# Patient Record
Sex: MALE | Race: WHITE | Employment: OTHER | URBAN - METROPOLITAN AREA
[De-identification: names, ages, dates, MRNs, and addresses within clinical notes are randomized per-mention and may not be internally consistent; named-entity substitution may affect disease eponyms.]

---

## 2017-05-18 PROBLEM — F98.8 ATTENTION DEFICIT DISORDER (ADD) WITHOUT HYPERACTIVITY: Status: ACTIVE | Noted: 2017-05-18

## 2017-05-18 PROBLEM — H40.89 GLAUCOMA ASSOCIATED WITH ANTERIOR SEGMENT ANOMALY: Status: ACTIVE | Noted: 2017-05-18

## 2017-05-18 PROBLEM — I82.409 DEEP VEIN THROMBOSIS (DVT) OF LOWER EXTREMITY (HCC): Status: ACTIVE | Noted: 2017-05-18

## 2017-05-18 PROBLEM — E10.9 TYPE 1 DIABETES MELLITUS (HCC): Status: ACTIVE | Noted: 2017-05-18

## 2017-05-18 PROBLEM — G47.33 OBSTRUCTIVE APNEA: Status: ACTIVE | Noted: 2017-05-18

## 2017-05-18 PROBLEM — F41.9 ANXIETY: Status: ACTIVE | Noted: 2017-05-18

## 2017-05-18 PROBLEM — E78.9 DISORDER OF LIPID METABOLISM: Status: ACTIVE | Noted: 2017-05-18

## 2017-05-18 PROBLEM — R51.9 HEADACHE: Status: ACTIVE | Noted: 2017-05-18

## 2017-05-18 PROBLEM — M17.9 ARTHRITIS OF KNEE, DEGENERATIVE: Status: ACTIVE | Noted: 2017-05-18

## 2017-05-18 PROBLEM — Q15.9 GLAUCOMA ASSOCIATED WITH ANTERIOR SEGMENT ANOMALY: Status: ACTIVE | Noted: 2017-05-18

## 2017-05-18 PROBLEM — Q79.2 EXOMPHALOS: Status: ACTIVE | Noted: 2017-05-18

## 2017-05-18 PROBLEM — H90.8 DEAFNESS, MIXED TYPE: Status: ACTIVE | Noted: 2017-05-18

## 2017-05-18 PROBLEM — R07.9 CHEST PAIN: Status: ACTIVE | Noted: 2017-05-18

## 2017-05-18 PROBLEM — J45.909 ASTHMA: Status: ACTIVE | Noted: 2017-05-18

## 2017-05-18 PROBLEM — M81.0 OSTEOPOROSIS: Status: ACTIVE | Noted: 2017-05-18

## 2017-05-18 PROBLEM — I10 ESSENTIAL (PRIMARY) HYPERTENSION: Status: ACTIVE | Noted: 2017-05-18

## 2017-05-18 PROBLEM — M54.6 ACUTE THORACIC BACK PAIN: Status: ACTIVE | Noted: 2017-05-18

## 2017-09-20 PROBLEM — Z87.442 PERSONAL HISTORY OF KIDNEY STONES: Status: ACTIVE | Noted: 2017-09-20

## 2018-02-20 PROBLEM — I38 VALVULAR HEART DISEASE: Status: ACTIVE | Noted: 2018-02-20

## 2018-02-20 PROBLEM — I50.22 CHRONIC SYSTOLIC CHF (CONGESTIVE HEART FAILURE) (HCC): Status: ACTIVE | Noted: 2018-02-20

## 2018-02-20 PROBLEM — E78.5 HYPERLIPIDEMIA: Status: ACTIVE | Noted: 2018-02-20

## 2018-07-26 PROBLEM — I35.0 MODERATE AORTIC VALVE STENOSIS: Status: ACTIVE | Noted: 2018-07-26

## 2019-01-28 PROBLEM — I25.5 ISCHEMIC CARDIOMYOPATHY: Chronic | Status: ACTIVE | Noted: 2019-01-28

## 2019-11-21 ENCOUNTER — OFFICE VISIT (OUTPATIENT)
Dept: PULMONOLOGY | Age: 84
End: 2019-11-21
Payer: MEDICARE

## 2019-11-21 VITALS
HEART RATE: 84 BPM | DIASTOLIC BLOOD PRESSURE: 70 MMHG | OXYGEN SATURATION: 95 % | HEIGHT: 70 IN | RESPIRATION RATE: 14 BRPM | BODY MASS INDEX: 31.78 KG/M2 | WEIGHT: 222 LBS | SYSTOLIC BLOOD PRESSURE: 123 MMHG

## 2019-11-21 DIAGNOSIS — I25.10 CORONARY ARTERY DISEASE INVOLVING NATIVE CORONARY ARTERY OF NATIVE HEART WITHOUT ANGINA PECTORIS: ICD-10-CM

## 2019-11-21 DIAGNOSIS — G47.31 CSA (CENTRAL SLEEP APNEA): ICD-10-CM

## 2019-11-21 DIAGNOSIS — I08.0 MITRAL REGURGITATION AND AORTIC STENOSIS: ICD-10-CM

## 2019-11-21 DIAGNOSIS — I50.22 CHRONIC SYSTOLIC CHF (CONGESTIVE HEART FAILURE) (HCC): ICD-10-CM

## 2019-11-21 DIAGNOSIS — G47.33 OSA (OBSTRUCTIVE SLEEP APNEA): Primary | ICD-10-CM

## 2019-11-21 PROCEDURE — G8427 DOCREV CUR MEDS BY ELIG CLIN: HCPCS | Performed by: INTERNAL MEDICINE

## 2019-11-21 PROCEDURE — 1036F TOBACCO NON-USER: CPT | Performed by: INTERNAL MEDICINE

## 2019-11-21 PROCEDURE — 99203 OFFICE O/P NEW LOW 30 MIN: CPT | Performed by: INTERNAL MEDICINE

## 2019-11-21 PROCEDURE — G8598 ASA/ANTIPLAT THER USED: HCPCS | Performed by: INTERNAL MEDICINE

## 2019-11-21 PROCEDURE — G8484 FLU IMMUNIZE NO ADMIN: HCPCS | Performed by: INTERNAL MEDICINE

## 2019-11-21 PROCEDURE — G8419 CALC BMI OUT NRM PARAM NOF/U: HCPCS | Performed by: INTERNAL MEDICINE

## 2019-11-21 PROCEDURE — 1123F ACP DISCUSS/DSCN MKR DOCD: CPT | Performed by: INTERNAL MEDICINE

## 2019-11-21 PROCEDURE — 4040F PNEUMOC VAC/ADMIN/RCVD: CPT | Performed by: INTERNAL MEDICINE

## 2019-11-21 RX ORDER — AMPHETAMINE SULFATE 10 MG/1
10 TABLET ORAL 4 TIMES DAILY
COMMUNITY

## 2019-11-21 ASSESSMENT — SLEEP AND FATIGUE QUESTIONNAIRES
HOW LIKELY ARE YOU TO NOD OFF OR FALL ASLEEP WHILE SITTING AND TALKING TO SOMEONE: 0
HOW LIKELY ARE YOU TO NOD OFF OR FALL ASLEEP WHILE SITTING QUIETLY AFTER LUNCH WITHOUT ALCOHOL: 3
HOW LIKELY ARE YOU TO NOD OFF OR FALL ASLEEP IN A CAR, WHILE STOPPED FOR A FEW MINUTES IN TRAFFIC: 0
HOW LIKELY ARE YOU TO NOD OFF OR FALL ASLEEP WHILE WATCHING TV: 1
HOW LIKELY ARE YOU TO NOD OFF OR FALL ASLEEP WHILE SITTING AND READING: 3
HOW LIKELY ARE YOU TO NOD OFF OR FALL ASLEEP WHEN YOU ARE A PASSENGER IN A CAR FOR AN HOUR WITHOUT A BREAK: 3
HOW LIKELY ARE YOU TO NOD OFF OR FALL ASLEEP WHILE SITTING INACTIVE IN A PUBLIC PLACE: 1
ESS TOTAL SCORE: 14
HOW LIKELY ARE YOU TO NOD OFF OR FALL ASLEEP WHILE LYING DOWN TO REST IN THE AFTERNOON WHEN CIRCUMSTANCES PERMIT: 3

## 2019-12-16 ENCOUNTER — HOSPITAL ENCOUNTER (OUTPATIENT)
Dept: SLEEP CENTER | Age: 84
Discharge: HOME | End: 2019-12-18
Payer: MEDICARE

## 2019-12-16 DIAGNOSIS — G47.33 OSA (OBSTRUCTIVE SLEEP APNEA): ICD-10-CM

## 2019-12-16 DIAGNOSIS — G47.31 CSA (CENTRAL SLEEP APNEA): ICD-10-CM

## 2019-12-16 PROCEDURE — 95810 POLYSOM 6/> YRS 4/> PARAM: CPT

## 2019-12-17 VITALS
WEIGHT: 202 LBS | BODY MASS INDEX: 28.92 KG/M2 | OXYGEN SATURATION: 95 % | HEIGHT: 70 IN | RESPIRATION RATE: 16 BRPM | HEART RATE: 66 BPM

## 2020-01-21 ENCOUNTER — HOSPITAL ENCOUNTER (OUTPATIENT)
Dept: SLEEP CENTER | Age: 85
Discharge: HOME | End: 2020-01-23
Payer: MEDICARE

## 2020-01-21 VITALS
WEIGHT: 202 LBS | BODY MASS INDEX: 28.92 KG/M2 | OXYGEN SATURATION: 92 % | HEART RATE: 69 BPM | HEIGHT: 70 IN | RESPIRATION RATE: 18 BRPM

## 2020-01-21 PROCEDURE — 95811 POLYSOM 6/>YRS CPAP 4/> PARM: CPT

## 2020-01-30 LAB — STATUS: NORMAL

## 2020-02-13 LAB — STATUS: NORMAL

## 2023-07-27 ENCOUNTER — HOSPITAL ENCOUNTER (OUTPATIENT)
Age: 88
Setting detail: SPECIMEN
Discharge: HOME | End: 2023-07-27

## 2023-07-27 LAB
ALBUMIN SERPL-MCNC: 4.2 G/DL (ref 3.5–5.2)
ALBUMIN/GLOB SERPL: 1.8 {RATIO} (ref 1–2.5)
ALP SERPL-CCNC: 103 U/L (ref 40–129)
ALT SERPL-CCNC: 23 U/L (ref 5–41)
ANION GAP SERPL CALCULATED.3IONS-SCNC: 11 MMOL/L (ref 9–17)
AST SERPL-CCNC: 22 U/L
BASOPHILS # BLD: 0.05 K/UL (ref 0–0.2)
BASOPHILS NFR BLD: 1 % (ref 0–2)
BILIRUB SERPL-MCNC: 0.5 MG/DL (ref 0.3–1.2)
BUN SERPL-MCNC: 18 MG/DL (ref 8–23)
CALCIUM SERPL-MCNC: 9.2 MG/DL (ref 8.6–10.4)
CHLORIDE SERPL-SCNC: 101 MMOL/L (ref 98–107)
CHOLEST SERPL-MCNC: 139 MG/DL
CHOLESTEROL/HDL RATIO: 2.4
CO2 SERPL-SCNC: 24 MMOL/L (ref 20–31)
CREAT SERPL-MCNC: 1.3 MG/DL (ref 0.7–1.2)
EOSINOPHIL # BLD: 0.07 K/UL (ref 0–0.44)
EOSINOPHILS RELATIVE PERCENT: 1 % (ref 1–4)
ERYTHROCYTE [DISTWIDTH] IN BLOOD BY AUTOMATED COUNT: 14 % (ref 11.8–14.4)
GFR SERPL CREATININE-BSD FRML MDRD: 52 ML/MIN/1.73M2
GLUCOSE SERPL-MCNC: 114 MG/DL (ref 70–99)
HCT VFR BLD AUTO: 38.9 % (ref 40.7–50.3)
HDLC SERPL-MCNC: 58 MG/DL
HGB BLD-MCNC: 13 G/DL (ref 13–17)
IMM GRANULOCYTES # BLD AUTO: 0.11 K/UL (ref 0–0.3)
IMM GRANULOCYTES NFR BLD: 2 %
LDLC SERPL CALC-MCNC: 60 MG/DL (ref 0–130)
LYMPHOCYTES NFR BLD: 1.55 K/UL (ref 1.1–3.7)
LYMPHOCYTES RELATIVE PERCENT: 24 % (ref 24–43)
MCH RBC QN AUTO: 32.4 PG (ref 25.2–33.5)
MCHC RBC AUTO-ENTMCNC: 33.4 G/DL (ref 28.4–34.8)
MCV RBC AUTO: 97 FL (ref 82.6–102.9)
MONOCYTES NFR BLD: 1.02 K/UL (ref 0.1–1.2)
MONOCYTES NFR BLD: 16 % (ref 3–12)
NEUTROPHILS NFR BLD: 56 % (ref 36–65)
NEUTS SEG NFR BLD: 3.57 K/UL (ref 1.5–8.1)
NRBC BLD-RTO: 0 PER 100 WBC
PLATELET # BLD AUTO: ABNORMAL K/UL (ref 138–453)
PLATELET, FLUORESCENCE: 100 K/UL (ref 138–453)
PLATELETS.RETICULATED NFR BLD AUTO: 3.5 % (ref 1.1–10.3)
POTASSIUM SERPL-SCNC: 4.4 MMOL/L (ref 3.7–5.3)
PROT SERPL-MCNC: 6.6 G/DL (ref 6.4–8.3)
RBC # BLD AUTO: 4.01 M/UL (ref 4.21–5.77)
SODIUM SERPL-SCNC: 136 MMOL/L (ref 135–144)
TRIGL SERPL-MCNC: 103 MG/DL
WBC OTHER # BLD: 6.4 K/UL (ref 3.5–11.3)

## 2023-10-18 ENCOUNTER — OFFICE VISIT (OUTPATIENT)
Age: 88
End: 2023-10-18
Payer: MEDICARE

## 2023-10-18 VITALS — WEIGHT: 185 LBS | BODY MASS INDEX: 26.48 KG/M2 | HEIGHT: 70 IN

## 2023-10-18 DIAGNOSIS — N40.1 BENIGN PROSTATIC HYPERPLASIA WITH URINARY OBSTRUCTION: Primary | ICD-10-CM

## 2023-10-18 DIAGNOSIS — R31.29 MICROSCOPIC HEMATURIA: ICD-10-CM

## 2023-10-18 DIAGNOSIS — N13.8 BENIGN PROSTATIC HYPERPLASIA WITH URINARY OBSTRUCTION: Primary | ICD-10-CM

## 2023-10-18 DIAGNOSIS — Z87.442 PERSONAL HISTORY OF KIDNEY STONES: ICD-10-CM

## 2023-10-18 DIAGNOSIS — R35.1 NOCTURIA: ICD-10-CM

## 2023-10-18 LAB
BILIRUBIN, POC: NORMAL
BLOOD URINE, POC: NORMAL
CLARITY, POC: CLEAR
COLOR, POC: YELLOW
GLUCOSE URINE, POC: NORMAL
KETONES, POC: NORMAL
LEUKOCYTE EST, POC: NORMAL
NITRITE, POC: NORMAL
PH, POC: NORMAL
PROTEIN, POC: NORMAL
SPECIFIC GRAVITY, POC: NORMAL
UROBILINOGEN, POC: NORMAL

## 2023-10-18 PROCEDURE — G8428 CUR MEDS NOT DOCUMENT: HCPCS | Performed by: SPECIALIST

## 2023-10-18 PROCEDURE — 1123F ACP DISCUSS/DSCN MKR DOCD: CPT | Performed by: SPECIALIST

## 2023-10-18 PROCEDURE — 81003 URINALYSIS AUTO W/O SCOPE: CPT | Performed by: SPECIALIST

## 2023-10-18 PROCEDURE — G8419 CALC BMI OUT NRM PARAM NOF/U: HCPCS | Performed by: SPECIALIST

## 2023-10-18 PROCEDURE — 99214 OFFICE O/P EST MOD 30 MIN: CPT | Performed by: SPECIALIST

## 2023-10-18 PROCEDURE — G8484 FLU IMMUNIZE NO ADMIN: HCPCS | Performed by: SPECIALIST

## 2023-10-18 PROCEDURE — 1036F TOBACCO NON-USER: CPT | Performed by: SPECIALIST

## 2023-10-18 RX ORDER — TAMSULOSIN HYDROCHLORIDE 0.4 MG/1
0.4 CAPSULE ORAL DAILY
Qty: 90 CAPSULE | Refills: 3 | Status: SHIPPED | OUTPATIENT
Start: 2023-10-18

## 2023-10-18 RX ORDER — FINASTERIDE 5 MG/1
5 TABLET, FILM COATED ORAL DAILY
Qty: 90 TABLET | Refills: 3 | Status: SHIPPED | OUTPATIENT
Start: 2023-10-18

## 2023-10-18 NOTE — PROGRESS NOTES
[facility] [address] Urology Office Progress Note    Patient:  Camille Wood  YOB: 1931  Date: 10/18/2023    HISTORY OF PRESENT ILLNESS:   The patient is a 80 y.o. male  Patient's lower urinary tract symptoms are stable on Flomax/tamsulosin 0.4 mg po qd and Finasteride 5 mg po qd for BPH symptoms. The patient presents with a history of microhematuria but today's UA was negative. Patient instructed to limit fluid intake 2-3 hours prior to bedtime to minimize nocturia or nocturnal incontinence.    Lower urinary tract symptoms: urgency, frequency, decreased urinary stream, and nocturia, 4 times per night   Last AUA Symptom Score (QOL): 6 (2)  Today's AUA Symptom Score (QOL): 10 (3)    Summary of old records:   BPH: tamsulosin 0.4 mg qd (some dizziness), using finasteride 5 mg qd; TURP 2002  Microhematuria with 9/1/11 CT and 3/14/12 cysto  Bilateral KS: 1.5 mm on 9/1/11 CT    Additional History: none    Procedures Today: N/A    Urinalysis today:  Results for POC orders placed in visit on 10/18/23   POCT Urinalysis No Micro (Auto)   Result Value Ref Range    Color, UA yellow     Clarity, UA clear     Glucose, UA POC neg     Bilirubin, UA      Ketones, UA      Spec Grav, UA      Blood, UA POC neg     pH, UA      Protein, UA POC trace     Urobilinogen, UA      Leukocytes, UA neg     Nitrite, UA neg        Last several PSA's:  Lab Results   Component Value Date    PSA 0.97 09/09/2013       Last total testosterone:  No results found for: \"TESTOSTERONE\"    Last BUN and creatinine:  Lab Results   Component Value Date    BUN 18 07/27/2023     Lab Results   Component Value Date    CREATININE 1.3 (H) 07/27/2023       Last CBC:  Lab Results   Component Value Date    WBC 6.4 07/27/2023    HGB 13.0 07/27/2023    HCT 38.9 (L) 07/27/2023    MCV 97.0 07/27/2023    PLT See Reflexed IPF Result 07/27/2023       Additional Lab/Culture results: none    Imaging Reviewed during this Office

## 2023-11-06 ENCOUNTER — HOSPITAL ENCOUNTER (OUTPATIENT)
Age: 88
Setting detail: SPECIMEN
Discharge: HOME | End: 2023-11-06

## 2023-11-06 ENCOUNTER — OFFICE VISIT (OUTPATIENT)
Age: 88
End: 2023-11-06
Payer: MEDICARE

## 2023-11-06 DIAGNOSIS — N13.8 BENIGN PROSTATIC HYPERPLASIA WITH URINARY OBSTRUCTION: ICD-10-CM

## 2023-11-06 DIAGNOSIS — R35.1 NOCTURIA: ICD-10-CM

## 2023-11-06 DIAGNOSIS — R31.29 MICROSCOPIC HEMATURIA: ICD-10-CM

## 2023-11-06 DIAGNOSIS — Z87.442 PERSONAL HISTORY OF KIDNEY STONES: ICD-10-CM

## 2023-11-06 DIAGNOSIS — N30.00 ACUTE CYSTITIS WITHOUT HEMATURIA: Primary | ICD-10-CM

## 2023-11-06 DIAGNOSIS — N40.1 BENIGN PROSTATIC HYPERPLASIA WITH URINARY OBSTRUCTION: ICD-10-CM

## 2023-11-06 DIAGNOSIS — N30.00 ACUTE CYSTITIS WITHOUT HEMATURIA: ICD-10-CM

## 2023-11-06 LAB
BILIRUBIN, POC: NORMAL
BLOOD URINE, POC: NORMAL
CLARITY, POC: NORMAL
COLOR, POC: NORMAL
GLUCOSE URINE, POC: NORMAL
KETONES, POC: NORMAL
LEUKOCYTE EST, POC: NORMAL
NITRITE, POC: POSITIVE
PH, POC: NORMAL
POST VOID RESIDUAL (PVR): 0 ML
PROTEIN, POC: NORMAL
SPECIFIC GRAVITY, POC: NORMAL
UROBILINOGEN, POC: NORMAL

## 2023-11-06 PROCEDURE — G8427 DOCREV CUR MEDS BY ELIG CLIN: HCPCS | Performed by: SPECIALIST

## 2023-11-06 PROCEDURE — 1036F TOBACCO NON-USER: CPT | Performed by: SPECIALIST

## 2023-11-06 PROCEDURE — 1123F ACP DISCUSS/DSCN MKR DOCD: CPT | Performed by: SPECIALIST

## 2023-11-06 PROCEDURE — G8419 CALC BMI OUT NRM PARAM NOF/U: HCPCS | Performed by: SPECIALIST

## 2023-11-06 PROCEDURE — 51798 US URINE CAPACITY MEASURE: CPT | Performed by: SPECIALIST

## 2023-11-06 PROCEDURE — 81003 URINALYSIS AUTO W/O SCOPE: CPT | Performed by: SPECIALIST

## 2023-11-06 PROCEDURE — 99214 OFFICE O/P EST MOD 30 MIN: CPT | Performed by: SPECIALIST

## 2023-11-06 PROCEDURE — G8484 FLU IMMUNIZE NO ADMIN: HCPCS | Performed by: SPECIALIST

## 2023-11-06 RX ORDER — CIPROFLOXACIN 500 MG/1
500 TABLET, FILM COATED ORAL 2 TIMES DAILY
Qty: 20 TABLET | Refills: 0 | Status: SHIPPED | OUTPATIENT
Start: 2023-11-06

## 2023-11-06 NOTE — PROGRESS NOTES
[facility] In[facility] [address] Urology Office Progress Note    Patient:  Dominga Davis  YOB: 1931  Date: 11/6/2023    HISTORY OF PRESENT ILLNESS:   The patient is a 80 y.o. male  Patient has worsening lower urinary tract symptoms despite Flomax/tamsulosin 0.4 mg po qd and Finasteride 5 mg po qd for BPH symptoms. His urine is suspicious for infection and Urine sent for C&S to rule out infection. Will begin Cipro 500 mg po bid pending above. He has a large amount of microhematuria which is likely due to the presence of infection. Lower urinary tract symptoms: urgency, frequency, decreased urinary stream, nocturia, 3 times per night, and feelings of DAYANA.    Last AUA Symptom Score (QOL): 10 (3)  Today's AUA Symptom Score (QOL): 12 (6)    Summary of old records:   BPH: tamsulosin 0.4 mg qd (some dizziness), using finasteride 5 mg qd; TURP 2002  Microhematuria with 9/1/11 CT and 3/14/12 cysto  Bilateral KS: 1.5 mm on 9/1/11 CT     Additional History: none    Procedures Today: Postvoid Residual:  Post-void Residual by bladder scanner: 0 mL     Urinalysis today:  Results for POC orders placed in visit on 11/06/23   POCT Urinalysis No Micro (Auto)   Result Value Ref Range    Color, UA [place]     Clarity, UA cloudy     Glucose, UA POC neg     Bilirubin, UA      Ketones, UA      Spec Grav, UA      Blood, UA POC large     pH, UA      Protein, UA POC >=300mg/dl     Urobilinogen, UA      Leukocytes, UA large     Nitrite, UA positive        Last several PSA's:  Lab Results   Component Value Date    PSA 0.97 09/09/2013       Last total testosterone:  No results found for: \"TESTOSTERONE\"    Last BUN and creatinine:  Lab Results   Component Value Date    BUN 18 07/27/2023     Lab Results   Component Value Date    CREATININE 1.3 (H) 07/27/2023       Last CBC:  Lab Results   Component Value Date    WBC 6.4 07/27/2023    HGB 13.0 07/27/2023    HCT 38.9 (L) 07/27/2023    MCV 97.0 07/27/2023

## 2023-11-08 DIAGNOSIS — Z87.442 PERSONAL HISTORY OF KIDNEY STONES: ICD-10-CM

## 2023-11-08 DIAGNOSIS — R35.1 NOCTURIA: ICD-10-CM

## 2023-11-08 DIAGNOSIS — N13.8 BENIGN PROSTATIC HYPERPLASIA WITH URINARY OBSTRUCTION: ICD-10-CM

## 2023-11-08 DIAGNOSIS — N30.00 ACUTE CYSTITIS WITHOUT HEMATURIA: Primary | ICD-10-CM

## 2023-11-08 DIAGNOSIS — R31.29 MICROSCOPIC HEMATURIA: ICD-10-CM

## 2023-11-08 DIAGNOSIS — N40.1 BENIGN PROSTATIC HYPERPLASIA WITH URINARY OBSTRUCTION: ICD-10-CM

## 2023-11-08 LAB
MICROORGANISM SPEC CULT: ABNORMAL
SPECIMEN DESCRIPTION: ABNORMAL

## 2023-11-16 ENCOUNTER — HOSPITAL ENCOUNTER (OUTPATIENT)
Age: 88
Setting detail: SPECIMEN
Discharge: HOME | End: 2023-11-16

## 2023-11-17 LAB
MICROORGANISM SPEC CULT: NORMAL
SPECIMEN DESCRIPTION: NORMAL

## 2024-01-29 ENCOUNTER — HOSPITAL ENCOUNTER (OUTPATIENT)
Age: 89
Setting detail: SPECIMEN
Discharge: HOME | End: 2024-01-29

## 2024-01-29 LAB
ALBUMIN SERPL-MCNC: 4.3 G/DL (ref 3.5–5.2)
ALBUMIN/GLOB SERPL: 2 {RATIO} (ref 1–2.5)
ALP SERPL-CCNC: 104 U/L (ref 40–129)
ALT SERPL-CCNC: 19 U/L (ref 10–50)
ANION GAP SERPL CALCULATED.3IONS-SCNC: 10 MMOL/L (ref 9–16)
AST SERPL-CCNC: 20 U/L (ref 10–50)
BASOPHILS # BLD: 0.03 K/UL (ref 0–0.2)
BASOPHILS NFR BLD: 1 % (ref 0–2)
BILIRUB SERPL-MCNC: 0.4 MG/DL (ref 0–1.2)
BUN SERPL-MCNC: 23 MG/DL (ref 8–23)
CALCIUM SERPL-MCNC: 9.3 MG/DL (ref 8.6–10.4)
CHLORIDE SERPL-SCNC: 106 MMOL/L (ref 98–107)
CO2 SERPL-SCNC: 25 MMOL/L (ref 20–31)
CREAT SERPL-MCNC: 1.3 MG/DL (ref 0.7–1.2)
EOSINOPHIL # BLD: 0.09 K/UL (ref 0–0.44)
EOSINOPHILS RELATIVE PERCENT: 1 % (ref 1–4)
ERYTHROCYTE [DISTWIDTH] IN BLOOD BY AUTOMATED COUNT: 14.1 % (ref 11.8–14.4)
GFR SERPL CREATININE-BSD FRML MDRD: 54 ML/MIN/1.73M2
GLUCOSE SERPL-MCNC: 112 MG/DL (ref 74–99)
HCT VFR BLD AUTO: 40.2 % (ref 40.7–50.3)
HGB BLD-MCNC: 13.2 G/DL (ref 13–17)
IMM GRANULOCYTES # BLD AUTO: 0.09 K/UL (ref 0–0.3)
IMM GRANULOCYTES NFR BLD: 1 %
LYMPHOCYTES NFR BLD: 1.8 K/UL (ref 1.1–3.7)
LYMPHOCYTES RELATIVE PERCENT: 29 % (ref 24–43)
MCH RBC QN AUTO: 32.4 PG (ref 25.2–33.5)
MCHC RBC AUTO-ENTMCNC: 32.8 G/DL (ref 28.4–34.8)
MCV RBC AUTO: 98.8 FL (ref 82.6–102.9)
MONOCYTES NFR BLD: 0.73 K/UL (ref 0.1–1.2)
MONOCYTES NFR BLD: 12 % (ref 3–12)
NEUTROPHILS NFR BLD: 56 % (ref 36–65)
NEUTS SEG NFR BLD: 3.5 K/UL (ref 1.5–8.1)
NRBC BLD-RTO: 0 PER 100 WBC
PLATELET # BLD AUTO: ABNORMAL K/UL (ref 138–453)
PLATELET, FLUORESCENCE: 120 K/UL (ref 138–453)
PLATELETS.RETICULATED NFR BLD AUTO: 4.3 % (ref 1.1–10.3)
POTASSIUM SERPL-SCNC: 4.7 MMOL/L (ref 3.7–5.3)
PROT SERPL-MCNC: 6.4 G/DL (ref 6.6–8.7)
RBC # BLD AUTO: 4.07 M/UL (ref 4.21–5.77)
SODIUM SERPL-SCNC: 141 MMOL/L (ref 136–145)
WBC OTHER # BLD: 6.2 K/UL (ref 3.5–11.3)

## 2024-01-29 RX ORDER — CIPROFLOXACIN 500 MG/1
500 TABLET, FILM COATED ORAL 2 TIMES DAILY
Qty: 20 TABLET | Refills: 0 | OUTPATIENT
Start: 2024-01-29

## 2024-01-29 NOTE — TELEPHONE ENCOUNTER
Last seen 11/6/23. No calls in or appointments scheduled. This medication ordered at that time, no indication patient needs refill.

## 2024-08-20 DIAGNOSIS — N40.1 BENIGN PROSTATIC HYPERPLASIA WITH URINARY OBSTRUCTION: ICD-10-CM

## 2024-08-20 DIAGNOSIS — N13.8 BENIGN PROSTATIC HYPERPLASIA WITH URINARY OBSTRUCTION: ICD-10-CM

## 2024-08-21 RX ORDER — FINASTERIDE 5 MG/1
5 TABLET, FILM COATED ORAL DAILY
Qty: 90 TABLET | Refills: 0 | Status: SHIPPED | OUTPATIENT
Start: 2024-08-21

## 2024-08-28 ENCOUNTER — HOSPITAL ENCOUNTER (OUTPATIENT)
Age: 89
Setting detail: SPECIMEN
Discharge: HOME | End: 2024-08-28

## 2024-08-28 LAB
25(OH)D3 SERPL-MCNC: 25.7 NG/ML (ref 30–100)
ALBUMIN SERPL-MCNC: 4.1 G/DL (ref 3.5–5.2)
ALBUMIN/GLOB SERPL: 2 {RATIO} (ref 1–2.5)
ALP SERPL-CCNC: 97 U/L (ref 40–129)
ALT SERPL-CCNC: 17 U/L (ref 10–50)
ANION GAP SERPL CALCULATED.3IONS-SCNC: 8 MMOL/L (ref 9–16)
AST SERPL-CCNC: 21 U/L (ref 10–50)
BASOPHILS # BLD: 0.04 K/UL (ref 0–0.2)
BASOPHILS NFR BLD: 1 % (ref 0–2)
BILIRUB SERPL-MCNC: 0.3 MG/DL (ref 0–1.2)
BUN SERPL-MCNC: 18 MG/DL (ref 8–23)
CALCIUM SERPL-MCNC: 9 MG/DL (ref 8.6–10.4)
CHLORIDE SERPL-SCNC: 105 MMOL/L (ref 98–107)
CHOLEST SERPL-MCNC: 149 MG/DL (ref 0–199)
CHOLESTEROL/HDL RATIO: 3
CO2 SERPL-SCNC: 26 MMOL/L (ref 20–31)
CREAT SERPL-MCNC: 1.4 MG/DL (ref 0.7–1.2)
EOSINOPHIL # BLD: 0.14 K/UL (ref 0–0.44)
EOSINOPHILS RELATIVE PERCENT: 2 % (ref 1–4)
ERYTHROCYTE [DISTWIDTH] IN BLOOD BY AUTOMATED COUNT: 14 % (ref 11.8–14.4)
GFR, ESTIMATED: 46 ML/MIN/1.73M2
GLUCOSE SERPL-MCNC: 125 MG/DL (ref 74–99)
HCT VFR BLD AUTO: 38.7 % (ref 40.7–50.3)
HDLC SERPL-MCNC: 59 MG/DL
HGB BLD-MCNC: 12.6 G/DL (ref 13–17)
IMM GRANULOCYTES # BLD AUTO: 0.08 K/UL (ref 0–0.3)
IMM GRANULOCYTES NFR BLD: 1 %
LDLC SERPL CALC-MCNC: 55 MG/DL (ref 0–100)
LYMPHOCYTES NFR BLD: 1.83 K/UL (ref 1.1–3.7)
LYMPHOCYTES RELATIVE PERCENT: 30 % (ref 24–43)
MAGNESIUM SERPL-MCNC: 1.8 MG/DL (ref 1.7–2.3)
MCH RBC QN AUTO: 32.6 PG (ref 25.2–33.5)
MCHC RBC AUTO-ENTMCNC: 32.6 G/DL (ref 28.4–34.8)
MCV RBC AUTO: 100.3 FL (ref 82.6–102.9)
MONOCYTES NFR BLD: 0.64 K/UL (ref 0.1–1.2)
MONOCYTES NFR BLD: 10 % (ref 3–12)
NEUTROPHILS NFR BLD: 56 % (ref 36–65)
NEUTS SEG NFR BLD: 3.41 K/UL (ref 1.5–8.1)
NRBC BLD-RTO: 0 PER 100 WBC
PHOSPHATE SERPL-MCNC: 3.6 MG/DL (ref 2.5–4.5)
PLATELET # BLD AUTO: ABNORMAL K/UL (ref 138–453)
PLATELET, FLUORESCENCE: 106 K/UL (ref 138–453)
PLATELETS.RETICULATED NFR BLD AUTO: 3.6 % (ref 1.1–10.3)
POTASSIUM SERPL-SCNC: 5.5 MMOL/L (ref 3.7–5.3)
PROT SERPL-MCNC: 6.5 G/DL (ref 6.6–8.7)
PTH-INTACT SERPL-MCNC: 66 PG/ML (ref 15–65)
RBC # BLD AUTO: 3.86 M/UL (ref 4.21–5.77)
SODIUM SERPL-SCNC: 139 MMOL/L (ref 136–145)
TRIGL SERPL-MCNC: 177 MG/DL
URATE SERPL-MCNC: 6.1 MG/DL (ref 3.4–7)
VLDLC SERPL CALC-MCNC: 35 MG/DL
WBC OTHER # BLD: 6.1 K/UL (ref 3.5–11.3)

## 2024-10-23 DIAGNOSIS — N13.8 BENIGN PROSTATIC HYPERPLASIA WITH URINARY OBSTRUCTION: ICD-10-CM

## 2024-10-23 DIAGNOSIS — N40.1 BENIGN PROSTATIC HYPERPLASIA WITH URINARY OBSTRUCTION: ICD-10-CM

## 2024-10-23 RX ORDER — FINASTERIDE 5 MG/1
5 TABLET, FILM COATED ORAL DAILY
Qty: 90 TABLET | Refills: 3 | OUTPATIENT
Start: 2024-10-23

## 2024-11-14 RX ORDER — TAMSULOSIN HYDROCHLORIDE 0.4 MG/1
0.4 CAPSULE ORAL DAILY
Qty: 90 CAPSULE | Refills: 3 | OUTPATIENT
Start: 2024-11-14

## 2025-01-07 ENCOUNTER — HOSPITAL ENCOUNTER (OUTPATIENT)
Age: 89
Setting detail: SPECIMEN
Discharge: HOME | End: 2025-01-07

## 2025-01-07 LAB
25(OH)D3 SERPL-MCNC: 41 NG/ML (ref 30–100)
ALBUMIN SERPL-MCNC: 4.3 G/DL (ref 3.5–5.2)
ALBUMIN/GLOB SERPL: 1.9 {RATIO} (ref 1–2.5)
ALP SERPL-CCNC: 117 U/L (ref 40–129)
ALT SERPL-CCNC: 21 U/L (ref 10–50)
ANION GAP SERPL CALCULATED.3IONS-SCNC: 9 MMOL/L (ref 9–16)
AST SERPL-CCNC: 25 U/L (ref 10–50)
BASOPHILS # BLD: 0.04 K/UL (ref 0–0.2)
BASOPHILS NFR BLD: 1 % (ref 0–2)
BILIRUB SERPL-MCNC: 0.4 MG/DL (ref 0–1.2)
BUN SERPL-MCNC: 23 MG/DL (ref 8–23)
CALCIUM SERPL-MCNC: 9.4 MG/DL (ref 8.6–10.4)
CHLORIDE SERPL-SCNC: 103 MMOL/L (ref 98–107)
CO2 SERPL-SCNC: 27 MMOL/L (ref 20–31)
CREAT SERPL-MCNC: 1.3 MG/DL (ref 0.7–1.2)
EOSINOPHIL # BLD: 0.1 K/UL (ref 0–0.44)
EOSINOPHILS RELATIVE PERCENT: 2 % (ref 1–4)
ERYTHROCYTE [DISTWIDTH] IN BLOOD BY AUTOMATED COUNT: 14.1 % (ref 11.8–14.4)
GFR, ESTIMATED: 51 ML/MIN/1.73M2
GLUCOSE SERPL-MCNC: 145 MG/DL (ref 74–99)
HCT VFR BLD AUTO: 39 % (ref 40.7–50.3)
HGB BLD-MCNC: 12.5 G/DL (ref 13–17)
IMM GRANULOCYTES # BLD AUTO: 0.08 K/UL (ref 0–0.3)
IMM GRANULOCYTES NFR BLD: 1 %
LYMPHOCYTES NFR BLD: 2.06 K/UL (ref 1.1–3.7)
LYMPHOCYTES RELATIVE PERCENT: 31 % (ref 24–43)
MCH RBC QN AUTO: 31.3 PG (ref 25.2–33.5)
MCHC RBC AUTO-ENTMCNC: 32.1 G/DL (ref 28.4–34.8)
MCV RBC AUTO: 97.7 FL (ref 82.6–102.9)
MONOCYTES NFR BLD: 0.72 K/UL (ref 0.1–1.2)
MONOCYTES NFR BLD: 11 % (ref 3–12)
NEUTROPHILS NFR BLD: 55 % (ref 36–65)
NEUTS SEG NFR BLD: 3.69 K/UL (ref 1.5–8.1)
NRBC BLD-RTO: 0 PER 100 WBC
PLATELET # BLD AUTO: ABNORMAL K/UL (ref 138–453)
PLATELET, FLUORESCENCE: 120 K/UL (ref 138–453)
PLATELETS.RETICULATED NFR BLD AUTO: 3.2 % (ref 1.1–10.3)
POTASSIUM SERPL-SCNC: 4.4 MMOL/L (ref 3.7–5.3)
PROT SERPL-MCNC: 6.6 G/DL (ref 6.6–8.7)
RBC # BLD AUTO: 3.99 M/UL (ref 4.21–5.77)
SODIUM SERPL-SCNC: 139 MMOL/L (ref 136–145)
WBC OTHER # BLD: 6.7 K/UL (ref 3.5–11.3)

## 2025-03-14 ENCOUNTER — HOSPITAL ENCOUNTER (OUTPATIENT)
Age: 89
Setting detail: SPECIMEN
Discharge: HOME | End: 2025-03-14

## 2025-03-14 LAB
T3FREE SERPL-MCNC: 3.22 PG/ML (ref 2–4.4)
T4 FREE SERPL-MCNC: 1 NG/DL (ref 0.92–1.68)
TSH SERPL DL<=0.05 MIU/L-ACNC: 5.92 UIU/ML (ref 0.27–4.2)

## 2025-07-09 ENCOUNTER — HOSPITAL ENCOUNTER (OUTPATIENT)
Age: 89
Setting detail: SPECIMEN
Discharge: HOME | End: 2025-07-09

## 2025-07-09 LAB
25(OH)D3 SERPL-MCNC: 56.3 NG/ML (ref 30–100)
ALBUMIN SERPL-MCNC: 3.9 G/DL (ref 3.5–5.2)
ALBUMIN/GLOB SERPL: 1.6 {RATIO} (ref 1–2.5)
ALP SERPL-CCNC: 109 U/L (ref 40–129)
ALT SERPL-CCNC: 16 U/L (ref 10–50)
ANION GAP SERPL CALCULATED.3IONS-SCNC: 9 MMOL/L (ref 9–16)
AST SERPL-CCNC: 20 U/L (ref 10–50)
BASOPHILS # BLD: 0.04 K/UL (ref 0–0.2)
BASOPHILS NFR BLD: 1 % (ref 0–2)
BILIRUB SERPL-MCNC: 0.4 MG/DL (ref 0–1.2)
BUN SERPL-MCNC: 18 MG/DL (ref 8–23)
CALCIUM SERPL-MCNC: 9.4 MG/DL (ref 8.6–10.4)
CHLORIDE SERPL-SCNC: 104 MMOL/L (ref 98–107)
CO2 SERPL-SCNC: 26 MMOL/L (ref 20–31)
CREAT SERPL-MCNC: 1.3 MG/DL (ref 0.7–1.2)
EOSINOPHIL # BLD: 0.13 K/UL (ref 0–0.44)
EOSINOPHILS RELATIVE PERCENT: 2 % (ref 1–4)
ERYTHROCYTE [DISTWIDTH] IN BLOOD BY AUTOMATED COUNT: 14 % (ref 11.8–14.4)
GFR, ESTIMATED: 51 ML/MIN/1.73M2
GLUCOSE SERPL-MCNC: 172 MG/DL (ref 74–99)
HCT VFR BLD AUTO: 37.2 % (ref 40.7–50.3)
HGB BLD-MCNC: 12.1 G/DL (ref 13–17)
IMM GRANULOCYTES # BLD AUTO: 0.07 K/UL (ref 0–0.3)
IMM GRANULOCYTES NFR BLD: 1 %
LYMPHOCYTES NFR BLD: 1.83 K/UL (ref 1.1–3.7)
LYMPHOCYTES RELATIVE PERCENT: 27 % (ref 24–43)
MAGNESIUM SERPL-MCNC: 1.8 MG/DL (ref 1.7–2.3)
MCH RBC QN AUTO: 31.8 PG (ref 25.2–33.5)
MCHC RBC AUTO-ENTMCNC: 32.5 G/DL (ref 28.4–34.8)
MCV RBC AUTO: 97.9 FL (ref 82.6–102.9)
MONOCYTES NFR BLD: 0.72 K/UL (ref 0.1–1.2)
MONOCYTES NFR BLD: 11 % (ref 3–12)
NEUTROPHILS NFR BLD: 59 % (ref 36–65)
NEUTS SEG NFR BLD: 4.06 K/UL (ref 1.5–8.1)
NRBC BLD-RTO: 0 PER 100 WBC
PHOSPHATE SERPL-MCNC: 3.2 MG/DL (ref 2.5–4.5)
PLATELET # BLD AUTO: ABNORMAL K/UL (ref 138–453)
PLATELET, FLUORESCENCE: 118 K/UL (ref 138–453)
PLATELETS.RETICULATED NFR BLD AUTO: 2.6 % (ref 1.1–10.3)
POTASSIUM SERPL-SCNC: 4.7 MMOL/L (ref 3.7–5.3)
PROT SERPL-MCNC: 6.4 G/DL (ref 6.6–8.7)
PTH-INTACT SERPL-MCNC: 50 PG/ML (ref 17.9–58.6)
RBC # BLD AUTO: 3.8 M/UL (ref 4.21–5.77)
SODIUM SERPL-SCNC: 139 MMOL/L (ref 136–145)
T3FREE SERPL-MCNC: 2.87 PG/ML (ref 2–4.4)
T4 FREE SERPL-MCNC: 0.9 NG/DL (ref 0.92–1.68)
TSH SERPL DL<=0.05 MIU/L-ACNC: 7.17 UIU/ML (ref 0.27–4.2)
WBC OTHER # BLD: 6.9 K/UL (ref 3.5–11.3)

## 2025-07-31 ENCOUNTER — HOSPITAL ENCOUNTER (OUTPATIENT)
Age: 89
Setting detail: SPECIMEN
Discharge: HOME | End: 2025-07-31

## 2025-07-31 LAB
ALT SERPL-CCNC: 31 U/L (ref 10–50)
AST SERPL-CCNC: 24 U/L (ref 10–50)
CHOLEST SERPL-MCNC: 150 MG/DL (ref 0–199)
CHOLESTEROL/HDL RATIO: 2.3
HDLC SERPL-MCNC: 66 MG/DL
LDLC SERPL CALC-MCNC: 65 MG/DL (ref 0–100)
TRIGL SERPL-MCNC: 97 MG/DL
VLDLC SERPL CALC-MCNC: 19 MG/DL (ref 1–30)